# Patient Record
Sex: FEMALE | Race: BLACK OR AFRICAN AMERICAN | NOT HISPANIC OR LATINO | Employment: FULL TIME | URBAN - METROPOLITAN AREA
[De-identification: names, ages, dates, MRNs, and addresses within clinical notes are randomized per-mention and may not be internally consistent; named-entity substitution may affect disease eponyms.]

---

## 2024-03-12 DIAGNOSIS — Z36.9 ENCOUNTER FOR FETAL ULTRASOUND: ICD-10-CM

## 2024-03-12 DIAGNOSIS — Z36.89 ENCOUNTER FOR FETAL ANATOMIC SURVEY: Primary | ICD-10-CM

## 2024-03-13 ENCOUNTER — TELEPHONE (OUTPATIENT)
Dept: MATERNAL FETAL MEDICINE | Facility: CLINIC | Age: 24
End: 2024-03-13
Payer: OTHER GOVERNMENT

## 2024-03-13 NOTE — TELEPHONE ENCOUNTER
Attempted to contact number listed. Left vm to call back to schedule appt at [phone], with [facility], [place], [place].

## 2024-03-15 ENCOUNTER — TELEPHONE (OUTPATIENT)
Dept: MATERNAL FETAL MEDICINE | Facility: CLINIC | Age: 24
End: 2024-03-15
Payer: OTHER GOVERNMENT

## 2024-03-15 NOTE — TELEPHONE ENCOUNTER
Pt verified self by name, . LAZ.  RN offered soonest avail appt at our [facility] office. Pt declined and wanted next avail at our [place] office.  Pt verbalized understanding. Agreed to POC w intent to comply.

## 2024-03-26 ENCOUNTER — PATIENT MESSAGE (OUTPATIENT)
Dept: MATERNAL FETAL MEDICINE | Facility: CLINIC | Age: 24
End: 2024-03-26
Payer: OTHER GOVERNMENT

## 2024-03-27 ENCOUNTER — PROCEDURE VISIT (OUTPATIENT)
Dept: MATERNAL FETAL MEDICINE | Facility: CLINIC | Age: 24
End: 2024-03-27
Payer: OTHER GOVERNMENT

## 2024-03-27 ENCOUNTER — OFFICE VISIT (OUTPATIENT)
Dept: MATERNAL FETAL MEDICINE | Facility: CLINIC | Age: 24
End: 2024-03-27
Payer: OTHER GOVERNMENT

## 2024-03-27 VITALS
BODY MASS INDEX: 31.71 KG/M2 | DIASTOLIC BLOOD PRESSURE: 73 MMHG | SYSTOLIC BLOOD PRESSURE: 117 MMHG | WEIGHT: 179 LBS | HEIGHT: 63 IN

## 2024-03-27 DIAGNOSIS — Z03.73 FETAL ANOMALY SUSPECTED BUT NOT FOUND: ICD-10-CM

## 2024-03-27 DIAGNOSIS — Z36.9 ENCOUNTER FOR FETAL ULTRASOUND: ICD-10-CM

## 2024-03-27 DIAGNOSIS — O36.5930 POOR FETAL GROWTH AFFECTING MANAGEMENT OF MOTHER IN THIRD TRIMESTER, SINGLE OR UNSPECIFIED FETUS: ICD-10-CM

## 2024-03-27 DIAGNOSIS — Z36.89 ENCOUNTER FOR FETAL ANATOMIC SURVEY: ICD-10-CM

## 2024-03-27 DIAGNOSIS — Z36.89 ENCOUNTER FOR FETAL ANATOMIC SURVEY: Primary | ICD-10-CM

## 2024-03-27 PROCEDURE — 76811 OB US DETAILED SNGL FETUS: CPT | Mod: S$GLB,,, | Performed by: OBSTETRICS & GYNECOLOGY

## 2024-03-27 PROCEDURE — 76819 FETAL BIOPHYS PROFIL W/O NST: CPT | Mod: S$GLB,,, | Performed by: OBSTETRICS & GYNECOLOGY

## 2024-03-27 PROCEDURE — 99204 OFFICE O/P NEW MOD 45 MIN: CPT | Mod: S$GLB,,, | Performed by: OBSTETRICS & GYNECOLOGY

## 2024-03-27 PROCEDURE — 76820 UMBILICAL ARTERY ECHO: CPT | Mod: S$GLB,,, | Performed by: OBSTETRICS & GYNECOLOGY

## 2024-03-27 NOTE — PROGRESS NOTES
Chief complaint: Suspected fetal anomaly (short femur), FGR    Provider requesting consultation: [facility]    23 y.o. at 35w1d EGA.    PMH:  Past Medical History:   Diagnosis Date    Constipation     Mild intermittent asthma, uncomplicated     Morbid obesity        PObHx:  OB History    Para Term  AB Living   1             SAB IAB Ectopic Multiple Live Births                  # Outcome Date GA Lbr Matt/2nd Weight Sex Delivery Anes PTL Lv   1 Current                PSH:History reviewed. No pertinent surgical history.    Family history:family history is not on file.    Social history: reports that she has been smoking cigarettes. She has quit using smokeless tobacco.    A detailed fetal anatomical ultrasound was completed today.  See details in imaging section of EPIC.    The patients fetus has been diagnosed with FGR based on EFW = 7th percentile/AC = 5th percentile. Potential etiologies of FGR include but are not limited to normal variation of stature, placental insufficiency, chromosomal abnormalities, genetic disorders, infections, medical conditions, teratogen exposure and other etiologies. Pregnancies complicated by FGR are at increased risk of stillbirth. We discussed delivery timing and recommendations for antepartum testing. FGR, by itself, is not an indication for  delivery, although there is an increased risk of needing a  due to heart rate abnormalities. Mode of delivery will be dictated by overall clinical status and doppler abnormalities (if any).     Recommendations:  Start twice weekly  fetal surveillance with NST and BPP until delivery.   Start weekly UA dopplers.   Repeat fetal growth ultrasound in 3 weeks  Patient counseled re: fetal movement monitoring and decreased fetal movement  Monitor closely for any signs of evolving preeclampsia.  If  testing is non-reassuring, delivery may be warranted regardless of GA.  For all pregnancies with FGR, the  placenta should be sent to pathology for examination.  Mode of delivery will be dictated by overall clinical status and doppler abnormalities (if any).     General delivery timing (EFW as opposed to AC percentile should be used to determine delivery timing):  Exceptions to these recommendations may occur (e.g. gestational age <26 weeks). However, in general, delivery should be considered when:    FGR (EFW 3rd-9th or EFW > 10th with AC < 10th percentile)  Normal testing, No co-morbid conditions: 380/7- 390/7  UA Doppler S/D ratio > 95th percentile: 370/7- 376/7  Co-morbid conditions: 370/7- 376/7 (see below)    FGR (< 3rd percentile)  Normal testing, no co-morbid conditions: 370/7- 376/7  Co-morbid conditions: 360/7- 376/7 weeks (see below)    Maternal co-morbid conditions - CHTN, pregestational DM, renal disease, autoimmune disease, AMA ? 40    Earlier delivery can be considered in conjunction with MFM in the setting of FGR with preeclampsia/gestational hypertension (360/7-370/7 weeks), multifetal pregnancy, or UA Doppler abnormalities (ie EFW < 3rd percentile with elevated UA Dopplers)     FGR + Oligohydramnios: At diagnosis, if GA ? 34 weeks (if less than 34 weeks, management needs to be individualized based on other testing and entire clinical scenario)  FGR + AEDF: By 34 weeks (33 0/7-34 6/7 weeks) if there is absent diastolic flow in the umbilical artery and there has not been a previous indication for delivery  FGR + REDF: By 32 weeks (30 0/7-32 6/7 weeks), if there is reversed diastolic flow in the umbilical artery and there has not been a previous indication for delivery    The patient was given an opportunity to ask questions about management and the diease process.  She expressed an understanding of and agreement to the above impression and plan. All questions were answered to her satisfaction.  She was given contact information to the [facility] clinic to address further concerns.

## 2024-03-28 ENCOUNTER — TELEPHONE (OUTPATIENT)
Dept: MATERNAL FETAL MEDICINE | Facility: CLINIC | Age: 24
End: 2024-03-28
Payer: OTHER GOVERNMENT

## 2024-03-28 NOTE — TELEPHONE ENCOUNTER
Attempted to contact number listed. Left vm to call back to schedule appt at [phone], with [facility], [place], [place].      Appt on April 18th needs to be moved

## 2024-04-01 ENCOUNTER — PROCEDURE VISIT (OUTPATIENT)
Dept: MATERNAL FETAL MEDICINE | Facility: CLINIC | Age: 24
End: 2024-04-01
Payer: OTHER GOVERNMENT

## 2024-04-01 DIAGNOSIS — Z36.89 ENCOUNTER FOR ULTRASOUND TO ASSESS FETAL GROWTH: ICD-10-CM

## 2024-04-01 DIAGNOSIS — Z36.89 ENCOUNTER FOR ULTRASOUND TO ASSESS FETAL GROWTH: Primary | ICD-10-CM

## 2024-04-01 PROCEDURE — 76819 FETAL BIOPHYS PROFIL W/O NST: CPT | Mod: S$GLB,,, | Performed by: OBSTETRICS & GYNECOLOGY

## 2024-04-01 PROCEDURE — 76820 UMBILICAL ARTERY ECHO: CPT | Mod: S$GLB,,, | Performed by: OBSTETRICS & GYNECOLOGY

## 2024-04-08 ENCOUNTER — PROCEDURE VISIT (OUTPATIENT)
Dept: MATERNAL FETAL MEDICINE | Facility: CLINIC | Age: 24
End: 2024-04-08
Payer: OTHER GOVERNMENT

## 2024-04-08 DIAGNOSIS — Z36.89 ENCOUNTER FOR ULTRASOUND TO ASSESS FETAL GROWTH: Primary | ICD-10-CM

## 2024-04-08 DIAGNOSIS — Z36.89 ENCOUNTER FOR ULTRASOUND TO ASSESS FETAL GROWTH: ICD-10-CM

## 2024-04-08 PROCEDURE — 76819 FETAL BIOPHYS PROFIL W/O NST: CPT | Mod: S$GLB,,, | Performed by: OBSTETRICS & GYNECOLOGY

## 2024-04-08 PROCEDURE — 76820 UMBILICAL ARTERY ECHO: CPT | Mod: S$GLB,,, | Performed by: OBSTETRICS & GYNECOLOGY

## 2024-04-18 ENCOUNTER — PROCEDURE VISIT (OUTPATIENT)
Dept: MATERNAL FETAL MEDICINE | Facility: CLINIC | Age: 24
End: 2024-04-18
Attending: OBSTETRICS & GYNECOLOGY
Payer: OTHER GOVERNMENT

## 2024-04-18 DIAGNOSIS — Z36.89 ENCOUNTER FOR FETAL ANATOMIC SURVEY: ICD-10-CM

## 2024-04-18 PROCEDURE — 76816 OB US FOLLOW-UP PER FETUS: CPT | Mod: S$GLB,,, | Performed by: OBSTETRICS & GYNECOLOGY

## 2024-04-18 PROCEDURE — 76820 UMBILICAL ARTERY ECHO: CPT | Mod: S$GLB,,, | Performed by: OBSTETRICS & GYNECOLOGY

## 2024-04-18 PROCEDURE — 76819 FETAL BIOPHYS PROFIL W/O NST: CPT | Mod: S$GLB,,, | Performed by: OBSTETRICS & GYNECOLOGY
